# Patient Record
Sex: FEMALE | Race: AMERICAN INDIAN OR ALASKA NATIVE
[De-identification: names, ages, dates, MRNs, and addresses within clinical notes are randomized per-mention and may not be internally consistent; named-entity substitution may affect disease eponyms.]

---

## 2019-10-26 ENCOUNTER — HOSPITAL ENCOUNTER (EMERGENCY)
Dept: HOSPITAL 5 - ED | Age: 69
Discharge: HOME | End: 2019-10-26
Payer: MEDICARE

## 2019-10-26 VITALS — SYSTOLIC BLOOD PRESSURE: 164 MMHG | DIASTOLIC BLOOD PRESSURE: 97 MMHG

## 2019-10-26 DIAGNOSIS — I10: Primary | ICD-10-CM

## 2019-10-26 DIAGNOSIS — E11.9: ICD-10-CM

## 2019-10-26 DIAGNOSIS — M79.7: ICD-10-CM

## 2019-10-26 DIAGNOSIS — Z85.9: ICD-10-CM

## 2019-10-26 DIAGNOSIS — Z91.040: ICD-10-CM

## 2019-10-26 DIAGNOSIS — Z88.8: ICD-10-CM

## 2019-10-26 DIAGNOSIS — Z90.710: ICD-10-CM

## 2019-10-26 DIAGNOSIS — Z79.899: ICD-10-CM

## 2019-10-26 DIAGNOSIS — Z98.51: ICD-10-CM

## 2019-10-26 DIAGNOSIS — M06.9: ICD-10-CM

## 2019-10-26 DIAGNOSIS — Z86.711: ICD-10-CM

## 2019-10-26 DIAGNOSIS — J44.9: ICD-10-CM

## 2019-10-26 DIAGNOSIS — Z76.0: ICD-10-CM

## 2019-10-26 NOTE — EMERGENCY DEPARTMENT REPORT
ED Recheck HPI





- General


Chief Complaint: Pain General


Stated Complaint: DIABETIC PAIN


Time Seen by Provider: 10/26/19 11:54


Source: patient


Mode of arrival: Ambulatory


Limitations: Physical Limitation





- History of Present Illness


MD Complaint: medication refill request


Returns Today for: request for prescription


Symptoms Since Prior Visit: worsening pain


Context: ran out of medication


Associated Symptoms: none





- Related Data


                                  Previous Rx's











 Medication  Instructions  Recorded  Last Taken  Type


 


DULoxetine [Cymbalta] 60 mg PO QDAY 15 Days #30 capsule 10/26/19 Unknown Rx











                                    Allergies











Allergy/AdvReac Type Severity Reaction Status Date / Time


 


latex Allergy  Rash Verified 10/26/19 11:49


 


tape Allergy  Rash Uncoded 10/26/19 11:49














ED Review of Systems


ROS: 


Stated complaint: DIABETIC PAIN


Other details as noted in HPI





Constitutional: denies: chills


Respiratory: denies: cough


Cardiovascular: denies: chest pain, palpitations, edema, syncope


Gastrointestinal: denies: abdominal pain, nausea, vomiting, diarrhea


Musculoskeletal: other (nerve pain and needs refill on cymbalta)


Skin: denies: rash


Neurological: paresthesias, confusion.  denies: headache, weakness, abnormal 

gait


Psychiatric: denies: anxiety





ED Past Medical Hx





- Past Medical History


Previous Medical History?: Yes


Hx Diabetes: Yes


Hx of Cancer: Yes


Hx Arthritis: Yes (Rheumatoid)


Hx Asthma: Yes


Hx COPD: Yes


Additional medical history: Fibromyalgia, Pulmonary problems





- Surgical History


Past Surgical History?: Yes


Additional Surgical History: Stomach cancer surgery, Hysterectomy @ age 29,.  

Tubaligation, sinus surgery





- Family History


Family history: hypertension





- Social History


Smoking Status: Never Smoker


Substance Use Type: None





- Medications


Home Medications: 


                                Home Medications











 Medication  Instructions  Recorded  Confirmed  Last Taken  Type


 


DULoxetine [Cymbalta] 60 mg PO QDAY 15 Days #30 capsule 10/26/19  Unknown Rx














ED Physical Exam





- General


Limitations: Physical Limitation


General appearance: alert, in no apparent distress





- Head


Head exam: Present: atraumatic, normocephalic





- Eye


Eye exam: Present: normal appearance, PERRL, EOMI


Pupils: Present: normal accommodation





- ENT


ENT exam: Present: normal exam, normal orophraynx, mucous membranes moist





- Neck


Neck exam: Present: normal inspection, full ROM.  Absent: tenderness





- Respiratory


Respiratory exam: Present: normal lung sounds bilaterally.  Absent: respiratory 

distress





- Cardiovascular


Cardiovascular Exam: Present: regular rate, normal rhythm, normal heart sounds





- GI/Abdominal


GI/Abdominal exam: Present: soft.  Absent: distended, tenderness





- Extremities Exam


Extremities exam: Present: normal inspection, full ROM, normal capillary refill.

  Absent: tenderness, pedal edema





- Back Exam


Back exam: Present: normal inspection, full ROM





- Neurological Exam


Neurological exam: Present: alert, oriented X3, normal gait





- Psychiatric


Psychiatric exam: Present: normal affect, normal mood





- Skin


Skin exam: Present: warm, dry, intact, normal color.  Absent: rash





ED Course


                                   Vital Signs











  10/26/19 10/26/19





  11:49 12:01


 


Temperature 98.7 F 


 


Pulse Rate 85 


 


Respiratory 20 





Rate  


 


Blood Pressure 173/107 


 


Blood Pressure  164/97





[Right]  


 


O2 Sat by Pulse 98 





Oximetry  














- Reevaluation(s)


Reevaluation #1: 





10/26/19 12:40


Patient stable and here for Cymbalta refill.





ED Recheck MDM





- Differential Diagnosis


Prescription Refill(s)





- Medical Decision Making





Patient here for refill on meds and asymptomatic. Refill on cymbalta given with 

instructions to follow up with PCP at [facility]


Critical care attestation.: 


If time is entered above; I have spent that time in minutes in the direct care 

of this critically ill patient, excluding procedure time.








ED Disposition


Clinical Impression: 


 Encounter for medication refill





Hypertension


Qualifiers:


 Hypertension type: essential hypertension Qualified Code(s): I10 - Essential 

(primary) hypertension





Disposition: DC-01 TO HOME OR SELFCARE


Is pt being admited?: No


Does the pt Need Aspirin: No


Condition: Stable


Instructions:  Duloxetine (By mouth), Diabetic Neuropathy (ED), Hypertension 

(ED)


Additional Instructions: 


follow up with PCP in 2 days


see referral for alternative pcp as requested.


If condition worsen, return to ED


Keep a log of blood pressure and bring to PCP visit


Continue to take norvasc as prescribed by pcp


Your blood pressure was mildly elevated today so, monitor and if greater that 

170/100 go to closest ed


Prescriptions: 


DULoxetine [Cymbalta] 60 mg PO QDAY 15 Days #30 capsule


Referrals: 


ANNE-MARIE COLEMAN MD [Primary Care Provider] - 10/28/19


JO JONES MD [Staff Physician] - 2-3 Days


Time of Disposition: 12:43

## 2021-10-18 ENCOUNTER — HOSPITAL ENCOUNTER (EMERGENCY)
Dept: HOSPITAL 5 - ED | Age: 71
LOS: 1 days | Discharge: HOME | End: 2021-10-19
Payer: MEDICARE

## 2021-10-18 DIAGNOSIS — E11.8: ICD-10-CM

## 2021-10-18 DIAGNOSIS — J45.909: ICD-10-CM

## 2021-10-18 DIAGNOSIS — M79.7: ICD-10-CM

## 2021-10-18 DIAGNOSIS — Z91.81: ICD-10-CM

## 2021-10-18 DIAGNOSIS — Z98.890: ICD-10-CM

## 2021-10-18 DIAGNOSIS — M19.90: ICD-10-CM

## 2021-10-18 DIAGNOSIS — Z91.048: ICD-10-CM

## 2021-10-18 DIAGNOSIS — G89.29: Primary | ICD-10-CM

## 2021-10-18 DIAGNOSIS — J44.9: ICD-10-CM

## 2021-10-18 DIAGNOSIS — R60.0: ICD-10-CM

## 2021-10-18 DIAGNOSIS — Z90.710: ICD-10-CM

## 2021-10-18 DIAGNOSIS — Z91.040: ICD-10-CM

## 2021-10-18 DIAGNOSIS — M54.50: ICD-10-CM

## 2021-10-18 LAB
ALBUMIN SERPL-MCNC: 4.1 G/DL (ref 3.9–5)
ALT SERPL-CCNC: 16 UNITS/L (ref 7–56)
BASOPHILS # (AUTO): 0 K/MM3 (ref 0–0.1)
BASOPHILS NFR BLD AUTO: 0.5 % (ref 0–1.8)
BUN SERPL-MCNC: 15 MG/DL (ref 7–17)
BUN/CREAT SERPL: 21 %
CALCIUM SERPL-MCNC: 9.4 MG/DL (ref 8.4–10.2)
EOSINOPHIL # BLD AUTO: 0.2 K/MM3 (ref 0–0.4)
EOSINOPHIL NFR BLD AUTO: 3.3 % (ref 0–4.3)
HCT VFR BLD CALC: 35.9 % (ref 30.3–42.9)
HEMOLYSIS INDEX: 3
HGB BLD-MCNC: 12.1 GM/DL (ref 10.1–14.3)
LYMPHOCYTES # BLD AUTO: 1.8 K/MM3 (ref 1.2–5.4)
LYMPHOCYTES NFR BLD AUTO: 26.9 % (ref 13.4–35)
MCHC RBC AUTO-ENTMCNC: 34 % (ref 30–34)
MCV RBC AUTO: 90 FL (ref 79–97)
MONOCYTES # (AUTO): 0.4 K/MM3 (ref 0–0.8)
MONOCYTES % (AUTO): 5.5 % (ref 0–7.3)
PLATELET # BLD: 384 K/MM3 (ref 140–440)
RBC # BLD AUTO: 3.98 M/MM3 (ref 3.65–5.03)

## 2021-10-18 PROCEDURE — 80053 COMPREHEN METABOLIC PANEL: CPT

## 2021-10-18 PROCEDURE — 84484 ASSAY OF TROPONIN QUANT: CPT

## 2021-10-18 PROCEDURE — 93970 EXTREMITY STUDY: CPT

## 2021-10-18 PROCEDURE — 93005 ELECTROCARDIOGRAM TRACING: CPT

## 2021-10-18 PROCEDURE — 72100 X-RAY EXAM L-S SPINE 2/3 VWS: CPT

## 2021-10-18 PROCEDURE — 85025 COMPLETE CBC W/AUTO DIFF WBC: CPT

## 2021-10-18 PROCEDURE — 73521 X-RAY EXAM HIPS BI 2 VIEWS: CPT

## 2021-10-18 PROCEDURE — 71046 X-RAY EXAM CHEST 2 VIEWS: CPT

## 2021-10-18 PROCEDURE — 36415 COLL VENOUS BLD VENIPUNCTURE: CPT

## 2021-10-18 PROCEDURE — 99284 EMERGENCY DEPT VISIT MOD MDM: CPT

## 2021-10-18 NOTE — XRAY REPORT
BILATERAL HIP 2 VIEW(S)



INDICATION / CLINICAL INFORMATION: pain after fall



COMPARISON: None available.

 

FINDINGS:



BONES / JOINT(S): No acute fracture or subluxation. No significant arthritis.



SOFT TISSUES: No significant abnormality.



ADDITIONAL FINDINGS: None.







Signer Name: Daniel Benoit DO 

Signed: 10/18/2021 11:08 PM

Workstation Name: [id number]

## 2021-10-18 NOTE — EMERGENCY DEPARTMENT REPORT
ED Fall HPI





- General


Chief Complaint: Fall


Stated Complaint: FELL/LEGS/KNEE SWELLING


Time Seen by Provider: 10/18/21 21:57


Source: patient


Mode of arrival: Ambulatory


Limitations: No Limitations





- History of Present Illness


Initial Comments: 





70-year-old female with a past medical history of hypertension, rheumatoid 

arthritis, asthma, diabetes, fibromyalgia, stomach cancer in the past, and 

chronic back pain presents to the hospital complaining of 3 falls in the past 

week.  Patient states he was ambulating with a cane and fell forward and was 

able to stop herself.  She denies preceding lightheadedness or near syncope.  

Because of the falls she has had worsening lower back pain, bilateral hip pain, 

and right thigh pain.  Patient also states she has bilateral lower extremity 

edema which is new.  She denies chest pain or shortness of breath.  She also 

denies head injury or LOC.  Patient takes Percocet 10 mg for chronic back pain. 

She states she received an MRI of her spine approximately 2 weeks ago as part of

her outpatient work-up.  Patient denies urinary incontinence but states when she

has to go to the bathroom she also urinates less because she cannot get to the 

bathroom in time.  This has been ongoing for several months and is unchanged.  

Patient presents here today with a walker





- Related Data


                                  Previous Rx's











 Medication  Instructions  Recorded  Last Taken  Type


 


DULoxetine [Cymbalta] 60 mg PO QDAY 15 Days #30 capsule 10/26/19 Unknown Rx











                                    Allergies











Allergy/AdvReac Type Severity Reaction Status Date / Time


 


latex Allergy  Rash Verified 10/26/19 11:49


 


tape Allergy  Rash Uncoded 10/26/19 11:49














ED Review of Systems


ROS: 


Stated complaint: FELL/LEGS/KNEE SWELLING


Other details as noted in HPI





Comment: All other systems reviewed and negative





ED Past Medical Hx





- Past Medical History


Previous Medical History?: Yes


Hx Diabetes: Yes


Hx Arthritis: Yes (Rheumatoid)


Hx Asthma: Yes


Hx COPD: Yes (Pt denies)


Additional medical history: Fibromyalgia





- Surgical History


Past Surgical History?: Yes


Additional Surgical History: Stomach cancer surgery, Hysterectomy @ age 29,.  

Tubaligation, sinus surgery





- Social History


Smoking Status: Never Smoker


Substance Use Type: None





- Medications


Home Medications: 


                                Home Medications











 Medication  Instructions  Recorded  Confirmed  Last Taken  Type


 


DULoxetine [Cymbalta] 60 mg PO QDAY 15 Days #30 capsule 10/26/19  Unknown Rx














ED Physical Exam





- General


Limitations: No Limitations, Physical Limitation





- Other


Other exam information: 





General: No acute distress


Head: Atraumatic


Eyes: normal appearance


ENT: Moist mucous membranes


Neck: Normal appearance, no midline tenderness


Chest: Clear to auscultation bilaterally


CV: Regular rate and rhythm


Abdomen: Soft, normal bowel sounds, nontender, nondistended, no rebound or 

guarding


Back: Normal inspection, generalized lumbar midline tenderness and paraspinal 

muscle tenderness


Extremity: Normal inspection, full range of motion, right anterior thigh 

muscular tenderness.  Minimal pain with movement of bilateral hips.  Bilateral 

knee swelling without warmth or erythema.  Bilateral 1+ pitting leg edema 

without isolated calf tenderness or leg asymmetry.  2+ DP pulses


Neuro: Alert O x 3, no facial asymmetry, speech clear, no gross motor sensory 

deficit


Psych: Appropriate behavior


Skin: No rash





ED Course


                                   Vital Signs











  10/18/21





  20:12


 


Temperature 98.3 F


 


Pulse Rate 89


 


Respiratory 19





Rate 


 


Blood Pressure 180/79


 


O2 Sat by Pulse 99





Oximetry 














ED Medical Decision Making





- Lab Data


Result diagrams: 


                                 10/18/21 20:22





                                 10/18/21 20:22








                                   Lab Results











  10/18/21 10/18/21 Range/Units





  20:22 20:22 


 


WBC  6.6   (4.5-11.0)  K/mm3


 


RBC  3.98   (3.65-5.03)  M/mm3


 


Hgb  12.1   (10.1-14.3)  gm/dl


 


Hct  35.9   (30.3-42.9)  %


 


MCV  90   (79-97)  fl


 


MCH  30   (28-32)  pg


 


MCHC  34   (30-34)  %


 


RDW  13.6   (13.2-15.2)  %


 


Plt Count  384   (140-440)  K/mm3


 


Lymph % (Auto)  26.9   (13.4-35.0)  %


 


Mono % (Auto)  5.5   (0.0-7.3)  %


 


Eos % (Auto)  3.3   (0.0-4.3)  %


 


Baso % (Auto)  0.5   (0.0-1.8)  %


 


Lymph # (Auto)  1.8   (1.2-5.4)  K/mm3


 


Mono # (Auto)  0.4   (0.0-0.8)  K/mm3


 


Eos # (Auto)  0.2   (0.0-0.4)  K/mm3


 


Baso # (Auto)  0.0   (0.0-0.1)  K/mm3


 


Seg Neutrophils %  63.8   (40.0-70.0)  %


 


Seg Neutrophils #  4.2   (1.8-7.7)  K/mm3


 


Sodium   140  (137-145)  mmol/L


 


Potassium   3.9  (3.6-5.0)  mmol/L


 


Chloride   104.0  ()  mmol/L


 


Carbon Dioxide   26  (22-30)  mmol/L


 


Anion Gap   14  mmol/L


 


BUN   15  (7-17)  mg/dL


 


Creatinine   0.7  (0.6-1.2)  mg/dL


 


Estimated GFR   > 60  ml/min


 


BUN/Creatinine Ratio   21  %


 


Glucose   144 H  ()  mg/dL


 


Calcium   9.4  (8.4-10.2)  mg/dL


 


Total Bilirubin   0.20  (0.1-1.2)  mg/dL


 


AST   14  (5-40)  units/L


 


ALT   16  (7-56)  units/L


 


Alkaline Phosphatase   117  ()  units/L


 


Troponin T   < 0.010  (0.00-0.029)  ng/mL


 


Total Protein   8.3 H  (6.3-8.2)  g/dL


 


Albumin   4.1  (3.9-5)  g/dL


 


Albumin/Globulin Ratio   1.0  %














- EKG Data


-: EKG Interpreted by Me


EKG shows normal: sinus rhythm, intervals (qtc 435), QRS complexes (qrsd 109), 

ST-T waves (no stemi/t wve inv)


Rate: normal (77)





- Radiology Data


Radiology results: report reviewed (Chest x-ray: No acute finding)





CHEST 2 VIEWS   


 


 INDICATION / CLINICAL INFORMATION: sob.  


 


 COMPARISON: None available.  


 


 FINDINGS:  


 


 SUPPORT DEVICES: None.  


 HEART / MEDIASTINUM: No significant abnormality.   


 LUNGS / PLEURA: No significant pulmonary or pleural abnormality. No 

pneumothorax.   


 


 ADDITIONAL FINDINGS: No significant additional findings.  


 


 IMPRESSION:  


 1. No acute findings





 


LUMBAR SPINE 2 VIEWS  


 


 INDICATION / CLINICAL INFORMATION: pain after fall, chronic pain.  


 


 COMPARISON: None available.  


 


 FINDINGS:  


 


 VERTEBRAE: No acute fracture. No significant malalignment.  


 


 DISC SPACES / FACET JOINTS:There is degenerative facet disease at L5-S1.  


 


 PARASPINAL SOFT TISSUES:No significant abnormality.  


 


 ADDITIONAL FINDINGS: None.  





BILATERAL HIP 2 VIEW(S)  


 


 INDICATION / CLINICAL INFORMATION: pain after fall  


 


 COMPARISON: None available.  


 


 FINDINGS:  


 


 BONES / JOINT(S): No acute fracture or subluxation. No significant arthritis.  


 


 SOFT TISSUES: No significant abnormality.  


 


 ADDITIONAL FINDINGS: None.  


 


DUPLEX DOPPLER LOWER EXTREMITY VEINS, BILATERAL  


 


 INDICATION / CLINICAL INFORMATION: b/l leg swelling.  


 


 TECHNIQUE: Duplex doppler imaging was performed through the veins of both lower

 extremities using 


venous compression and other maneuvers.  


 


 COMPARISON: None available.  


 


 FINDINGS:  


 RIGHT COMMON FEMORAL VEIN: Negative.  


 RIGHT FEMORAL VEIN: Negative.  


 RIGHT POPLITEAL VEIN: Negative.  


 RIGHT CALF VEINS: Negative.  


 


 LEFT COMMON FEMORAL VEIN: Negative.  


 LEFT FEMORAL VEIN: Negative.  


 LEFT POPLITEAL VEIN: Negative.  


 LEFT CALF VEINS: Negative.  


 


 ADDITIONAL FINDINGS: None.  


 


 IMPRESSION:  


 1. No sonographic evidence for DVT in either lower extremity.  


 





- Medical Decision Making





70-year female presents to the hospital after having several falls this week.  

Patient has a history of chronic pain including back pain, joint pain and 

swelling, and requiring a cane and sometimes a walker to ambulate.  Patient has 

a pain specialist and is undergoing outpatient work-up for ongoing chronic back 

pain.  No reports of increased weakness or urinary incontinence.  Patient able 

to ambulate with a walker in the ED.  X-rays do not reveal any acute 

abnormality.  Bilateral Dopplers for leg edema do not reveal acute DVT.  No 

chest x-ray findings of CHF and no laboratory findings of liver or renal 

failure.  Patient decided p.o. Percocet here in the ED.  Patient will be d

ischarged to follow-up with her physicians for continued outpatient work-up


Critical Care Time: No


Critical care attestation.: 


If time is entered above; I have spent that time in minutes in the direct care 

of this critically ill patient, excluding procedure time.








ED Disposition


Clinical Impression: 


 Frequent falls, Chronic pain, Bilateral edema of lower extremity





Disposition: 01 HOME / SELF CARE / HOMELESS


Is pt being admited?: No


Does the pt Need Aspirin: No


Condition: Stable


Instructions:  Fall Prevention in the Home, Adult, Easy-to-Read, Chronic Pain, 

Adult, Edema


Additional Instructions: 


Continue current medications.  Follow-up with your doctor or doctor/clinic 

provided.  Return if symptoms worsen as indicated by your discharge 

instructions.


Referrals: 


PRIMARY CARE,MD [Primary Care Provider] - 3-5 Days

## 2021-10-18 NOTE — XRAY REPORT
LUMBAR SPINE 2 VIEWS



INDICATION / CLINICAL INFORMATION: pain after fall, chronic pain.



COMPARISON: None available.



FINDINGS:



VERTEBRAE: No acute fracture. No significant malalignment.



DISC SPACES / FACET JOINTS:There is degenerative facet disease at L5-S1.



PARASPINAL SOFT TISSUES:No significant abnormality.



ADDITIONAL FINDINGS: None.







Signer Name: Daniel Benoit DO 

Signed: 10/18/2021 11:09 PM

Workstation Name: [id number]

## 2021-10-18 NOTE — XRAY REPORT
CHEST 2 VIEWS 



INDICATION / CLINICAL INFORMATION: sob.



COMPARISON: None available.



FINDINGS:



SUPPORT DEVICES: None.

HEART / MEDIASTINUM: No significant abnormality. 

LUNGS / PLEURA: No significant pulmonary or pleural abnormality. No pneumothorax. 



ADDITIONAL FINDINGS: No significant additional findings.



IMPRESSION:

1. No acute findings.



Signer Name: Ketan Narvaez MD 

Signed: 10/18/2021 9:41 PM

Workstation Name: [id number]

## 2021-10-19 VITALS — SYSTOLIC BLOOD PRESSURE: 141 MMHG | DIASTOLIC BLOOD PRESSURE: 95 MMHG

## 2021-10-19 NOTE — VASCULAR LAB REPORT
DUPLEX DOPPLER LOWER EXTREMITY VEINS, BILATERAL



INDICATION / CLINICAL INFORMATION: b/l leg swelling.



TECHNIQUE: Duplex doppler imaging was performed through the veins of both lower extremities using misty
ous compression and other maneuvers.



COMPARISON: None available.



FINDINGS:

RIGHT COMMON FEMORAL VEIN: Negative.

RIGHT FEMORAL VEIN: Negative.

RIGHT POPLITEAL VEIN: Negative.

RIGHT CALF VEINS: Negative.



LEFT COMMON FEMORAL VEIN: Negative.

LEFT FEMORAL VEIN: Negative.

LEFT POPLITEAL VEIN: Negative.

LEFT CALF VEINS: Negative.



ADDITIONAL FINDINGS: None.



IMPRESSION:

1. No sonographic evidence for DVT in either lower extremity.



Signer Name: Daniel Benoit DO 

Signed: 10/19/2021 12:03 AM

Workstation Name: [id number]

## 2022-08-03 ENCOUNTER — HOSPITAL ENCOUNTER (EMERGENCY)
Dept: HOSPITAL 5 - ED | Age: 72
Discharge: HOME | End: 2022-08-03
Payer: MEDICARE

## 2022-08-03 VITALS — DIASTOLIC BLOOD PRESSURE: 89 MMHG | SYSTOLIC BLOOD PRESSURE: 160 MMHG

## 2022-08-03 DIAGNOSIS — M25.561: ICD-10-CM

## 2022-08-03 DIAGNOSIS — J44.9: ICD-10-CM

## 2022-08-03 DIAGNOSIS — E11.9: ICD-10-CM

## 2022-08-03 DIAGNOSIS — Z91.040: ICD-10-CM

## 2022-08-03 DIAGNOSIS — Z79.899: ICD-10-CM

## 2022-08-03 DIAGNOSIS — G89.29: Primary | ICD-10-CM

## 2022-08-03 DIAGNOSIS — M19.90: ICD-10-CM

## 2022-08-03 DIAGNOSIS — M25.562: ICD-10-CM

## 2022-08-03 DIAGNOSIS — Z98.51: ICD-10-CM

## 2022-08-03 DIAGNOSIS — M79.7: ICD-10-CM

## 2022-08-03 DIAGNOSIS — Z90.710: ICD-10-CM

## 2022-08-03 PROCEDURE — 96372 THER/PROPH/DIAG INJ SC/IM: CPT

## 2022-08-03 PROCEDURE — 99282 EMERGENCY DEPT VISIT SF MDM: CPT

## 2022-08-03 NOTE — EMERGENCY DEPARTMENT REPORT
ED General Adult HPI





- General


Chief complaint: Fall


Stated complaint: MULTIPLE FALLS


PUI?: No


Time Seen by Provider: 08/03/22 13:04


Source: patient


Mode of arrival: Ambulatory


Limitations: No Limitations





- History of Present Illness


Initial comments: 





70 yo comes to ER with b knee pain. This pain is chronic in nature. No new fall 

or trauma. She has fallen in the past which has caused her a/c pain. 





PT is on pain meds at home but comes to ER requesting decadron IM because this 

is all she finds helps her pain when her knees "flare."





Pt does have hx arthritis/fibromyalgia 





No fever or chills.


no cp


no sob





-: Gradual, days(s)


Location: lower extremity


Severity scale (0 -10): 3


Quality: aching


Consistency: constant


Improves with: immobilization


Worsens with: movement


Associated Symptoms: denies other symptoms





- Related Data


                                  Previous Rx's











 Medication  Instructions  Recorded  Last Taken  Type


 


DULoxetine [Cymbalta] 60 mg PO QDAY 15 Days #30 capsule 10/26/19 Unknown Rx


 


methylPREDNISolone [Medrol 4MG 4 mg PO FS #1 tab.ds.pk 08/03/22 Unknown Rx





DOSEPAK (21 tabs)]    











                                    Allergies











Allergy/AdvReac Type Severity Reaction Status Date / Time


 


latex Allergy  Rash Verified 08/03/22 13:04


 


tape Allergy  Rash Uncoded 08/03/22 13:04














ED Review of Systems


ROS: 


Stated complaint: MULTIPLE FALLS


Other details as noted in HPI





Comment: All other systems reviewed and negative





ED Past Medical Hx





- Past Medical History


Previous Medical History?: Yes


Hx Diabetes: Yes


Hx Arthritis: Yes (Rheumatoid)


Hx Asthma: Yes


Hx COPD: Yes (Pt denies)


Additional medical history: Fibromyalgia





- Surgical History


Past Surgical History?: Yes


Additional Surgical History: Stomach cancer surgery, Hysterectomy @ age 29,.  

Tubaligation, sinus surgery





- Family History


Family history: no significant





- Social History


Smoking Status: Never Smoker


Substance Use Type: None





- Medications


Home Medications: 


                                Home Medications











 Medication  Instructions  Recorded  Confirmed  Last Taken  Type


 


DULoxetine [Cymbalta] 60 mg PO QDAY 15 Days #30 capsule 10/26/19  Unknown Rx


 


methylPREDNISolone [Medrol 4MG 4 mg PO FS #1 tab.ds.pk 08/03/22  Unknown Rx





DOSEPAK (21 tabs)]     














ED Physical Exam





- General


Limitations: No Limitations


General appearance: alert, in no apparent distress





- Head


Head exam: Present: atraumatic, normocephalic





- Eye


Eye exam: Present: normal appearance





- ENT


ENT exam: Present: mucous membranes moist





- Neck


Neck exam: Present: normal inspection





- Respiratory


Respiratory exam: Present: normal lung sounds bilaterally.  Absent: respiratory 

distress





- Cardiovascular


Cardiovascular Exam: Present: regular rate, normal rhythm.  Absent: systolic 

murmur, diastolic murmur, rubs, gallop





- GI/Abdominal


GI/Abdominal exam: Present: soft, normal bowel sounds





- Extremities Exam


Extremities exam: Present: normal inspection





- Back Exam


Back exam: Present: normal inspection





- Neurological Exam


Neurological exam: Present: alert, oriented X3





- Psychiatric


Psychiatric exam: Present: normal affect, normal mood





- Skin


Skin exam: Present: warm, dry, intact, normal color.  Absent: rash





ED Course


                                   Vital Signs











  08/03/22





  13:02


 


Temperature 98.6 F


 


Pulse Rate 90


 


Respiratory 18





Rate 


 


Blood Pressure 160/89





[Left] 


 


O2 Sat by Pulse 100





Oximetry 














ED Medical Decision Making





- Medical Decision Making








                                   Vital Signs











  08/03/22





  13:02


 


Temperature 98.6 F


 


Pulse Rate 90


 


Respiratory 18





Rate 


 


Blood Pressure 160/89





[Left] 


 


O2 Sat by Pulse 100





Oximetry 








medicated with decadron 8 mg IM





pt ambulatory


neuro vasc intact


plus 2 DP bilateral 


pain reproducible with movement 





dc home with dc plan of care including diet, meds, activity and follow up. Pt 

verbalizes understanding of plan of care. 





- Differential Diagnosis


a/c pain


Critical care attestation.: 


If time is entered above; I have spent that time in minutes in the direct care 

of this critically ill patient, excluding procedure time.








ED Disposition


Clinical Impression: 


 Knee pain, Chronic pain





Disposition: 01 HOME / SELF CARE / HOMELESS


Is pt being admited?: No


Does the pt Need Aspirin: No


Condition: Stable


Instructions:  Joint Pain, Easy-to-Read


Additional Instructions: 


FOLLOW UP WITH ORTHO





MED AS ORDERED TODAY





CONTINUE HOME MEDS








Prescriptions: 


methylPREDNISolone [Medrol 4MG DOSEPAK (21 tabs)] 4 mg PO FS #1 tab.ds.pk


Referrals: 


YAKELIN TURNER MD [Staff Physician] - 3-5 Days


Forms:  Work/School Release Form(ED)


Time of Disposition: 13:47

## 2022-08-31 ENCOUNTER — HOSPITAL ENCOUNTER (EMERGENCY)
Dept: HOSPITAL 5 - ED | Age: 72
LOS: 1 days | Discharge: HOME | End: 2022-09-01
Payer: MEDICARE

## 2022-08-31 DIAGNOSIS — G89.29: ICD-10-CM

## 2022-08-31 DIAGNOSIS — M79.7: Primary | ICD-10-CM

## 2022-08-31 DIAGNOSIS — Z91.040: ICD-10-CM

## 2022-08-31 DIAGNOSIS — E11.9: ICD-10-CM

## 2022-08-31 DIAGNOSIS — Z76.5: ICD-10-CM

## 2022-08-31 DIAGNOSIS — J45.909: ICD-10-CM

## 2022-08-31 LAB
ALBUMIN SERPL-MCNC: 4.1 G/DL (ref 3.9–5)
ALT SERPL-CCNC: 14 UNITS/L (ref 7–56)
BASOPHILS # (AUTO): 0 K/MM3 (ref 0–0.1)
BASOPHILS NFR BLD AUTO: 0.4 % (ref 0–1.8)
BUN SERPL-MCNC: 15 MG/DL (ref 7–17)
BUN/CREAT SERPL: 21 %
CALCIUM SERPL-MCNC: 9.3 MG/DL (ref 8.4–10.2)
EOSINOPHIL # BLD AUTO: 0.1 K/MM3 (ref 0–0.4)
EOSINOPHIL NFR BLD AUTO: 1.2 % (ref 0–4.3)
HCT VFR BLD CALC: 31.8 % (ref 30.3–42.9)
HEMOLYSIS INDEX: 2
HGB BLD-MCNC: 10.6 GM/DL (ref 10.1–14.3)
LYMPHOCYTES # BLD AUTO: 1.8 K/MM3 (ref 1.2–5.4)
LYMPHOCYTES NFR BLD AUTO: 28.9 % (ref 13.4–35)
MCHC RBC AUTO-ENTMCNC: 34 % (ref 30–34)
MCV RBC AUTO: 87 FL (ref 79–97)
MONOCYTES # (AUTO): 0.3 K/MM3 (ref 0–0.8)
MONOCYTES % (AUTO): 4.9 % (ref 0–7.3)
PLATELET # BLD: 437 K/MM3 (ref 140–440)
RBC # BLD AUTO: 3.65 M/MM3 (ref 3.65–5.03)

## 2022-08-31 PROCEDURE — 93005 ELECTROCARDIOGRAM TRACING: CPT

## 2022-08-31 PROCEDURE — 71046 X-RAY EXAM CHEST 2 VIEWS: CPT

## 2022-08-31 PROCEDURE — 83880 ASSAY OF NATRIURETIC PEPTIDE: CPT

## 2022-08-31 PROCEDURE — 82550 ASSAY OF CK (CPK): CPT

## 2022-08-31 PROCEDURE — 80053 COMPREHEN METABOLIC PANEL: CPT

## 2022-08-31 PROCEDURE — 82553 CREATINE MB FRACTION: CPT

## 2022-08-31 PROCEDURE — 99282 EMERGENCY DEPT VISIT SF MDM: CPT

## 2022-08-31 PROCEDURE — 84484 ASSAY OF TROPONIN QUANT: CPT

## 2022-08-31 PROCEDURE — 85025 COMPLETE CBC W/AUTO DIFF WBC: CPT

## 2022-08-31 PROCEDURE — 36415 COLL VENOUS BLD VENIPUNCTURE: CPT

## 2022-08-31 PROCEDURE — 96372 THER/PROPH/DIAG INJ SC/IM: CPT

## 2022-08-31 PROCEDURE — 99284 EMERGENCY DEPT VISIT MOD MDM: CPT

## 2022-08-31 NOTE — XRAY REPORT
CHEST 2 VIEWS 



INDICATION / CLINICAL INFORMATION: Syncope.



COMPARISON: 10/18/21



FINDINGS:



SUPPORT DEVICES: None.

HEART / MEDIASTINUM: No significant abnormality. 

LUNGS / PLEURA: No significant pulmonary or pleural abnormality. No pneumothorax. 



ADDITIONAL FINDINGS: No significant additional findings.



IMPRESSION:

1. No acute findings.



Signer Name: RADHA Houston MD 

Signed: 8/31/2022 3:36 PM

Workstation Name: [id number]

## 2022-09-01 VITALS — SYSTOLIC BLOOD PRESSURE: 175 MMHG | DIASTOLIC BLOOD PRESSURE: 87 MMHG

## 2022-09-02 NOTE — ELECTROCARDIOGRAPH REPORT
[facility]

                                       

Test Date:    2022               Test Time:    14:59:25

Pat Name:     SOTERO GARCIA          Department:   

Patient ID:   [id number]          Room:          

Gender:       F                        Technician:   IVONNE

:          1950               Requested By: ED DOC

Order Number: [id number]             Reading MD:   Nitza Goyal

                                 Measurements

Intervals                              Axis          

Rate:         78                       P:            29

RI:           156                      QRS:          -2

QRSD:         106                      T:            51

QT:           387                                    

QTc:          440                                    

                           Interpretive Statements

Sinus rhythm

Compared to ECG 10/18/2021 20:51:47

No significant changes

Electronically Signed On 2022 13:25:13 EDT by Nitza Goyal